# Patient Record
Sex: FEMALE | Race: WHITE | NOT HISPANIC OR LATINO | Employment: OTHER | URBAN - METROPOLITAN AREA
[De-identification: names, ages, dates, MRNs, and addresses within clinical notes are randomized per-mention and may not be internally consistent; named-entity substitution may affect disease eponyms.]

---

## 2017-10-19 ENCOUNTER — APPOINTMENT (OUTPATIENT)
Dept: RADIOLOGY | Facility: MEDICAL CENTER | Age: 82
End: 2017-10-19
Attending: EMERGENCY MEDICINE
Payer: MEDICARE

## 2017-10-19 ENCOUNTER — HOSPITAL ENCOUNTER (EMERGENCY)
Facility: MEDICAL CENTER | Age: 82
End: 2017-10-19
Attending: EMERGENCY MEDICINE
Payer: MEDICARE

## 2017-10-19 VITALS
SYSTOLIC BLOOD PRESSURE: 185 MMHG | OXYGEN SATURATION: 96 % | BODY MASS INDEX: 26.43 KG/M2 | DIASTOLIC BLOOD PRESSURE: 113 MMHG | TEMPERATURE: 98.1 F | WEIGHT: 140 LBS | RESPIRATION RATE: 16 BRPM | HEIGHT: 61 IN | HEART RATE: 74 BPM

## 2017-10-19 DIAGNOSIS — S72.491A OTHER CLOSED FRACTURE OF DISTAL END OF RIGHT FEMUR, INITIAL ENCOUNTER (HCC): ICD-10-CM

## 2017-10-19 LAB
ALBUMIN SERPL BCP-MCNC: 3.2 G/DL (ref 3.2–4.9)
ALBUMIN/GLOB SERPL: 1.1 G/DL
ALP SERPL-CCNC: 49 U/L (ref 30–99)
ALT SERPL-CCNC: 5 U/L (ref 2–50)
ANION GAP SERPL CALC-SCNC: 8 MMOL/L (ref 0–11.9)
APTT PPP: 24.7 SEC (ref 24.7–36)
AST SERPL-CCNC: 10 U/L (ref 12–45)
BASOPHILS # BLD AUTO: 0.2 % (ref 0–1.8)
BASOPHILS # BLD: 0.01 K/UL (ref 0–0.12)
BILIRUB SERPL-MCNC: 0.4 MG/DL (ref 0.1–1.5)
BUN SERPL-MCNC: 32 MG/DL (ref 8–22)
CALCIUM SERPL-MCNC: 8.6 MG/DL (ref 8.5–10.5)
CHLORIDE SERPL-SCNC: 109 MMOL/L (ref 96–112)
CO2 SERPL-SCNC: 21 MMOL/L (ref 20–33)
CREAT SERPL-MCNC: 1.94 MG/DL (ref 0.5–1.4)
EOSINOPHIL # BLD AUTO: 0.11 K/UL (ref 0–0.51)
EOSINOPHIL NFR BLD: 1.7 % (ref 0–6.9)
ERYTHROCYTE [DISTWIDTH] IN BLOOD BY AUTOMATED COUNT: 44.9 FL (ref 35.9–50)
GFR SERPL CREATININE-BSD FRML MDRD: 24 ML/MIN/1.73 M 2
GLOBULIN SER CALC-MCNC: 3 G/DL (ref 1.9–3.5)
GLUCOSE SERPL-MCNC: 123 MG/DL (ref 65–99)
HCT VFR BLD AUTO: 35.5 % (ref 37–47)
HGB BLD-MCNC: 11.4 G/DL (ref 12–16)
IMM GRANULOCYTES # BLD AUTO: 0.02 K/UL (ref 0–0.11)
IMM GRANULOCYTES NFR BLD AUTO: 0.3 % (ref 0–0.9)
INR PPP: 1.08 (ref 0.87–1.13)
LYMPHOCYTES # BLD AUTO: 1.04 K/UL (ref 1–4.8)
LYMPHOCYTES NFR BLD: 16.3 % (ref 22–41)
MCH RBC QN AUTO: 30 PG (ref 27–33)
MCHC RBC AUTO-ENTMCNC: 32.1 G/DL (ref 33.6–35)
MCV RBC AUTO: 93.4 FL (ref 81.4–97.8)
MONOCYTES # BLD AUTO: 0.5 K/UL (ref 0–0.85)
MONOCYTES NFR BLD AUTO: 7.8 % (ref 0–13.4)
NEUTROPHILS # BLD AUTO: 4.69 K/UL (ref 2–7.15)
NEUTROPHILS NFR BLD: 73.7 % (ref 44–72)
NRBC # BLD AUTO: 0 K/UL
NRBC BLD AUTO-RTO: 0 /100 WBC
PLATELET # BLD AUTO: 234 K/UL (ref 164–446)
PMV BLD AUTO: 10.2 FL (ref 9–12.9)
POTASSIUM SERPL-SCNC: 5 MMOL/L (ref 3.6–5.5)
PROT SERPL-MCNC: 6.2 G/DL (ref 6–8.2)
PROTHROMBIN TIME: 14.3 SEC (ref 12–14.6)
RBC # BLD AUTO: 3.8 M/UL (ref 4.2–5.4)
SODIUM SERPL-SCNC: 138 MMOL/L (ref 135–145)
WBC # BLD AUTO: 6.4 K/UL (ref 4.8–10.8)

## 2017-10-19 PROCEDURE — 85025 COMPLETE CBC W/AUTO DIFF WBC: CPT

## 2017-10-19 PROCEDURE — 80053 COMPREHEN METABOLIC PANEL: CPT

## 2017-10-19 PROCEDURE — 96375 TX/PRO/DX INJ NEW DRUG ADDON: CPT

## 2017-10-19 PROCEDURE — 96376 TX/PRO/DX INJ SAME DRUG ADON: CPT

## 2017-10-19 PROCEDURE — 94760 N-INVAS EAR/PLS OXIMETRY 1: CPT

## 2017-10-19 PROCEDURE — 700111 HCHG RX REV CODE 636 W/ 250 OVERRIDE (IP): Performed by: EMERGENCY MEDICINE

## 2017-10-19 PROCEDURE — 36415 COLL VENOUS BLD VENIPUNCTURE: CPT

## 2017-10-19 PROCEDURE — 96361 HYDRATE IV INFUSION ADD-ON: CPT

## 2017-10-19 PROCEDURE — 85730 THROMBOPLASTIN TIME PARTIAL: CPT

## 2017-10-19 PROCEDURE — 71010 DX-CHEST-LIMITED (1 VIEW): CPT

## 2017-10-19 PROCEDURE — 85610 PROTHROMBIN TIME: CPT

## 2017-10-19 PROCEDURE — 96374 THER/PROPH/DIAG INJ IV PUSH: CPT

## 2017-10-19 PROCEDURE — 700105 HCHG RX REV CODE 258: Performed by: EMERGENCY MEDICINE

## 2017-10-19 PROCEDURE — 72170 X-RAY EXAM OF PELVIS: CPT

## 2017-10-19 PROCEDURE — 73551 X-RAY EXAM OF FEMUR 1: CPT | Mod: RT

## 2017-10-19 PROCEDURE — 99285 EMERGENCY DEPT VISIT HI MDM: CPT

## 2017-10-19 RX ORDER — MORPHINE SULFATE 4 MG/ML
2 INJECTION, SOLUTION INTRAMUSCULAR; INTRAVENOUS ONCE
Status: COMPLETED | OUTPATIENT
Start: 2017-10-19 | End: 2017-10-19

## 2017-10-19 RX ORDER — SODIUM CHLORIDE 9 MG/ML
1000 INJECTION, SOLUTION INTRAVENOUS ONCE
Status: COMPLETED | OUTPATIENT
Start: 2017-10-19 | End: 2017-10-19

## 2017-10-19 RX ORDER — ONDANSETRON 2 MG/ML
4 INJECTION INTRAMUSCULAR; INTRAVENOUS ONCE
Status: COMPLETED | OUTPATIENT
Start: 2017-10-19 | End: 2017-10-19

## 2017-10-19 RX ADMIN — MORPHINE SULFATE 2 MG: 4 INJECTION INTRAVENOUS at 09:13

## 2017-10-19 RX ADMIN — MORPHINE SULFATE 2 MG: 4 INJECTION INTRAVENOUS at 18:24

## 2017-10-19 RX ADMIN — ONDANSETRON HYDROCHLORIDE 4 MG: 2 INJECTION, SOLUTION INTRAMUSCULAR; INTRAVENOUS at 09:13

## 2017-10-19 RX ADMIN — MORPHINE SULFATE 2 MG: 4 INJECTION INTRAVENOUS at 14:32

## 2017-10-19 RX ADMIN — SODIUM CHLORIDE 1000 ML: 9 INJECTION, SOLUTION INTRAVENOUS at 10:30

## 2017-10-19 RX ADMIN — ONDANSETRON HYDROCHLORIDE 4 MG: 2 INJECTION, SOLUTION INTRAMUSCULAR; INTRAVENOUS at 18:24

## 2017-10-19 ASSESSMENT — LIFESTYLE VARIABLES: DO YOU DRINK ALCOHOL: NO

## 2017-10-19 NOTE — DISCHARGE PLANNING
Pt and daughter aware that [facility] is PPO for the Medicare/[employer] insurance.  Referral to [facility] [phone].  Multiple calls for bed status.  No bed at this moment.  [facility] anticipating bed being available.  William completed  Records Copied  Imaging to push to [facility].      1700  Per Marely at [facility] pt will go to Room 476.  [facility] to transport at 1845.  Pt and daughter aware and agreeable.  Pedro RN at bedside aware.  Pedro to call report to floor.    No other needs verbalized/id'd by pt/daughter/MD/staff

## 2017-10-19 NOTE — ED PROVIDER NOTES
ED Provider Note    Scribed for Luis Maddox M.D. by Kb Bravo. 10/19/2017, 8:55 AM.    Primary care provider: Sebastien Elena M.D.  Means of arrival: Ambulance  History obtained from: Patient and daughter  History limited by: None    CHIEF COMPLAINT  Chief Complaint   Patient presents with   • GLF     tripped at home     HPI  Shelley Reyes is a 89 y.o. female who presents to the Emergency Department complaining of a ground level fall, onset prior to arrival in the ED. The patient lives is [facility] living. She was walking around the house when she fell near the shower. Per daughter when she falls she is usually unable to lift herself up. The patient now complains of severe right hip pain. Her pain is rated as a 7/10 on a pain scale. She has no alleviating factors for her pain. The patient's pain is exacerbated by movement. Per daughter the patient has broken a hip in the past.  The patient's daughter reports that it seems the patient is losing her memory as she does not remember a previous hip fracture.     REVIEW OF SYSTEMS  Pertinent positives include fall, memory loss, and leg pain.   Pertinent negatives include no head trauma, neck pain, back pain, other pain, or loss of consciousness.   As above, all other systems reviewed and are negative.   See [facility] for further details.   C.     PAST MEDICAL HISTORY   has a past medical history of Anxiety state, unspecified; CAD (coronary artery disease) (September 2012); CHF (congestive heart failure) (CMS-[facility]) (September 2011); Hyperlipidemia; Hypertension; OSTEOPOROSIS; Osteoporosis, unspecified; Personal history of malignant neoplasm of large intestine; and Unspecified hereditary and idiopathic peripheral neuropathy.    SURGICAL HISTORY   has a past surgical history that includes other (COLON SURGERY.); other (HIP SURGERY); and tonsillectomy.    SOCIAL HISTORY  Social History   Substance Use Topics   • Smoking status: Never Smoker   • Smokeless  "tobacco: Never Used   • Alcohol use Yes      History   Drug Use No     FAMILY HISTORY  Family History   Problem Relation Age of Onset   • Hypertension Mother    • Heart Disease Father      CURRENT MEDICATIONS  Home Medications     Reviewed by Pedro Gonzalez R.N. (Registered Nurse) on 10/19/17 at 0832  Med List Status: <None>   Medication Last Dose Status   aspirin (ASA) 81 MG CHEW 6/2/2015 Active   carvedilol (COREG) 6.25 MG TABS 6/2/2015 Active   lisinopril (PRINIVIL) 5 MG TABS 6/2/2015 Active   lovastatin (MEVACOR) 40 MG tablet 6/2/2015 Active   spironolactone/hctz (ALDACTAZIDE) 25-25 MG TABS 6/2/2015 Active              ALLERGIES  Allergies   Allergen Reactions   • Sulfa Drugs      PHYSICAL EXAM  VITAL SIGNS: BP (!) 185/113   Pulse 84   Temp 36.7 °C (98.1 °F)   Resp 16   Ht 1.549 m (5' 1\")   Wt 63.5 kg (140 lb)   BMI 26.45 kg/m²   Vitals reviewed.  Constitutional: Alert.  HENT: No signs of trauma, Bilateral external ears normal, Nose normal.   Eyes: Pupils are equal and reactive, Conjunctiva normal, Non-icteric.   Neck: Normal range of motion, No tenderness, Supple, No stridor.   Lymphatic: No lymphadenopathy noted.   Cardiovascular: Regular rate and rhythm, no murmurs.   Thorax & Lungs: Normal breath sounds, No respiratory distress, No wheezing, No chest tenderness.   Abdomen: Bowel sounds normal, Soft, No tenderness, No peritoneal signs, No masses, No pulsatile masses.   Skin: Warm, Dry, No erythema, No rash.   Back: No bony tenderness, No CVA tenderness.   Extremities: Intact distal pulses, No edema, No cyanosis  Musculoskeletal: Decreased range of motion in right hip. Right hip is tender to palpation, right leg internally rotated. Pain with ROM. Pain when palpating the femur.  Neurologic: Alert , Normal motor function, Normal sensory function, No focal deficits noted.   Psychiatric: Affect normal, Judgment normal, Mood normal.     DIAGNOSTIC STUDIES / PROCEDURES    LABS  Labs Reviewed   CBC WITH " DIFFERENTIAL - Abnormal; Notable for the following:        Result Value    RBC 3.80 (*)     Hemoglobin 11.4 (*)     Hematocrit 35.5 (*)     MCHC 32.1 (*)     Neutrophils-Polys 73.70 (*)     Lymphocytes 16.30 (*)     All other components within normal limits    Narrative:     Indicate which anticoagulants the patient is on:->UNKNOWN   COMP METABOLIC PANEL - Abnormal; Notable for the following:     Glucose 123 (*)     Bun 32 (*)     Creatinine 1.94 (*)     AST(SGOT) 10 (*)     All other components within normal limits    Narrative:     Indicate which anticoagulants the patient is on:->UNKNOWN   ESTIMATED GFR - Abnormal; Notable for the following:     GFR If  29 (*)     GFR If Non  24 (*)     All other components within normal limits    Narrative:     Indicate which anticoagulants the patient is on:->UNKNOWN   APTT    Narrative:     Indicate which anticoagulants the patient is on:->UNKNOWN   PROTHROMBIN TIME    Narrative:     Indicate which anticoagulants the patient is on:->UNKNOWN      All labs reviewed by me.    RADIOLOGY  DX-PELVIS-1 OR 2 VIEWS   Final Result      1.  No pelvic fracture.   2.  Diffuse osteopenia.   3.  Postoperative change of RIGHT foot.      DX-FEMUR-1 VIEW RIGHT   Final Result      1.  Comminuted, displaced and angulated fracture of the distal RIGHT femur.   2.  Diffuse osteopenia.      DX-CHEST-LIMITED (1 VIEW)   Final Result      No acute cardiopulmonary disease.        The radiologist's interpretation of all radiological studies have been reviewed by me.    COURSE & MEDICAL DECISION MAKING  Nursing notes, VS, PMSFHx reviewed in chart.  Differential diagnoses include but not limited to: hip fracture, hip dislocation, hip contusion.       8:55 AM Patient seen and examined at bedside. Ordered for hip x ray, femur x ray, CBC, CMP, APTT, PT/INR, and chest x ray to evaluate. Patient will be treated with Morphine and Zofran for her symptoms.      10:13 AM The patient  had one episode of hypotension after morphine. Thus 1 L of NS IV was given to resuscitate the patient.     11:25 AM I was informed by billing that [facility] is not the preferred provider for her insurance.     11:37 AM Recheck with the patient and her daughter. I discussed with them that she has a femur fracture at this time that will need to be repaired. They understand and are agreeable to the plan for transfer to [facility] her insurance's preferred provider. I spoke with Dr. Ordaz at [facility] that will accept the patient.     DISPOSITION:  Patient will be transferred to [facility] in stable condition.     FINAL IMPRESSION  1. Other closed fracture of distal end of right femur, initial encounter (CMS-[facility])      Kb HO (Scribe), am scribing for, and in the presence of, Luis Maddox M.D.    Electronically signed by: Kb Bravo (Peymane), 10/19/2017    ILuis M.D. personally performed the services described in this documentation, as scribed by Kb Bravo in my presence, and it is both accurate and complete.    The note accurately reflects work and decisions made by me.  Luis Maddox  10/19/2017  12:56 PM

## 2017-10-19 NOTE — ED NOTES
Patient updated on POC. No concerns at this time. Awaiting to see whether patient will be transferred

## 2017-10-19 NOTE — ED NOTES
"Chief Complaint   Patient presents with   • GLF     tripped at home     BP (!) 185/113   Pulse 84   Temp 36.7 °C (98.1 °F)   Resp 16   Ht 1.549 m (5' 1\")   Wt 63.5 kg (140 lb)   BMI 26.45 kg/m²   Patient brought in by ems from [facility]. Patient was walking around house when she suffered a glf and injured her right hip. Patient complains of pain on a scale of 7/10 pain. Her right leg exhibits internal rotation. Full CMS intact. Patient is A&Ox4.  "

## 2017-10-19 NOTE — ED NOTES
Patient updated on POC and is currently resting comfortably. Awaiting bed to open up at [facility] before arranging transport

## 2017-10-20 NOTE — ED NOTES
Report called to [facility]. Phone number for receiving nurse was wrong. Message was left with UC and unit charge RN. Currently awaiting call back. Patient assigned [facility] room 47